# Patient Record
Sex: MALE | Race: WHITE | Employment: FULL TIME | URBAN - METROPOLITAN AREA
[De-identification: names, ages, dates, MRNs, and addresses within clinical notes are randomized per-mention and may not be internally consistent; named-entity substitution may affect disease eponyms.]

---

## 2024-11-14 ENCOUNTER — OFFICE VISIT (OUTPATIENT)
Dept: ORTHOPEDIC SURGERY | Age: 54
End: 2024-11-14

## 2024-11-14 VITALS — HEIGHT: 72 IN | WEIGHT: 221 LBS | BODY MASS INDEX: 29.93 KG/M2

## 2024-11-14 DIAGNOSIS — M17.0 PRIMARY OSTEOARTHRITIS OF BOTH KNEES: ICD-10-CM

## 2024-11-14 DIAGNOSIS — M25.562 PAIN IN BOTH KNEES, UNSPECIFIED CHRONICITY: Primary | ICD-10-CM

## 2024-11-14 DIAGNOSIS — M25.561 PAIN IN BOTH KNEES, UNSPECIFIED CHRONICITY: Primary | ICD-10-CM

## 2024-11-14 RX ORDER — BUPIVACAINE HYDROCHLORIDE 2.5 MG/ML
2 INJECTION, SOLUTION INFILTRATION; PERINEURAL ONCE
Status: COMPLETED | OUTPATIENT
Start: 2024-11-14 | End: 2024-11-14

## 2024-11-14 RX ORDER — TRIAMCINOLONE ACETONIDE 40 MG/ML
40 INJECTION, SUSPENSION INTRA-ARTICULAR; INTRAMUSCULAR ONCE
Status: COMPLETED | OUTPATIENT
Start: 2024-11-14 | End: 2024-11-14

## 2024-11-14 RX ADMIN — BUPIVACAINE HYDROCHLORIDE 5 MG: 2.5 INJECTION, SOLUTION INFILTRATION; PERINEURAL at 13:42

## 2024-11-14 RX ADMIN — TRIAMCINOLONE ACETONIDE 40 MG: 40 INJECTION, SUSPENSION INTRA-ARTICULAR; INTRAMUSCULAR at 13:42

## 2024-11-15 NOTE — PROGRESS NOTES
This dictation was done with [employer] dictation and may contain mechanical errors related to translation.    I have today reviewed with Brendan Stewart the clinically relevant, past medical history, medications, allergies, family history, social history, and Review Of Systems form the patient’s most recent history form & I have documented any details relevant to today's presenting complaints in my history below. Mr. Brendan Stewart's self-reported past medical history, medications, allergies, family history, social history, and Review Of Systems form has been scanned into the chart under the \"Media\" tab.    Subjective:  Brendan Stewart is a 54 y.o. who is here as a new patient to [facility].  He has been to [facility] in the past has had both ACL feree's constructed for his knees.  Most recently he had a rotator cuff repair and is finishing out his rehab on that.  The pain in his knees have been somewhat progressive causes and achiness and soreness at night.  Demand disability during the day is minimal and it seems to be more acutely attentive and progressively worsening problem.  He currently states his knee pain at a 4 out of 10 but again it is worse with activities.      There is no problem list on file for this patient.          No current outpatient medications on file prior to visit.     No current facility-administered medications on file prior to visit.         Objective:   Height 1.829 m (6'), weight 100.2 kg (221 lb).    On examination is a very pleasant 54-year-old gentleman who is alert and oriented x 3 he has a 0 to about 136 degrees of motion he has pain with squatting pain in the medial parapatellar region bilaterally with 1+ edema and some crepitus during flexion extension through the patellofemoral joint.  Ligament repair appears to be intact and stable bilaterally.  Quad tone is fair good distal pulses good dorsiflexion plantarflexion strength.  Neuro exam grossly intact both lower extremities.